# Patient Record
Sex: FEMALE | Race: OTHER | HISPANIC OR LATINO | Employment: FULL TIME | URBAN - METROPOLITAN AREA
[De-identification: names, ages, dates, MRNs, and addresses within clinical notes are randomized per-mention and may not be internally consistent; named-entity substitution may affect disease eponyms.]

---

## 2017-07-05 ENCOUNTER — OFFICE VISIT (OUTPATIENT)
Dept: URGENT CARE | Facility: MEDICAL CENTER | Age: 27
End: 2017-07-05
Payer: MEDICARE

## 2017-07-05 PROCEDURE — G0382 LEV 3 HOSP TYPE B ED VISIT: HCPCS

## 2017-07-05 PROCEDURE — 96372 THER/PROPH/DIAG INJ SC/IM: CPT

## 2017-07-05 PROCEDURE — 99283 EMERGENCY DEPT VISIT LOW MDM: CPT

## 2018-01-18 ENCOUNTER — OFFICE VISIT (OUTPATIENT)
Dept: URGENT CARE | Facility: MEDICAL CENTER | Age: 28
End: 2018-01-18
Payer: MEDICARE

## 2018-01-18 DIAGNOSIS — J11.1 INFLUENZA DUE TO UNIDENTIFIED INFLUENZA VIRUS WITH OTHER RESPIRATORY MANIFESTATIONS: ICD-10-CM

## 2018-01-18 PROCEDURE — 99213 OFFICE O/P EST LOW 20 MIN: CPT

## 2018-01-18 PROCEDURE — G0382 LEV 3 HOSP TYPE B ED VISIT: HCPCS

## 2018-01-19 ENCOUNTER — APPOINTMENT (OUTPATIENT)
Dept: LAB | Facility: HOSPITAL | Age: 28
End: 2018-01-19
Payer: MEDICARE

## 2018-01-19 DIAGNOSIS — J11.1 INFLUENZA DUE TO UNIDENTIFIED INFLUENZA VIRUS WITH OTHER RESPIRATORY MANIFESTATIONS: ICD-10-CM

## 2018-01-19 LAB
FLUAV AG SPEC QL: NORMAL
FLUBV AG SPEC QL: NORMAL
RSV B RNA SPEC QL NAA+PROBE: NORMAL

## 2018-01-19 PROCEDURE — 87798 DETECT AGENT NOS DNA AMP: CPT

## 2018-01-19 NOTE — PROGRESS NOTES
Assessment   1  Influenza-like syndrome (487 1) (J11 1)    Plan   Influenza-like syndrome    · Oseltamivir Phosphate 75 MG Oral Capsule (Tamiflu); TAKE 1 CAPSULE TWICE    DAILY    Discussion/Summary   Discussion Summary:    Flu test performed  Patient started on Tamiflu 75 milligram b i d  for 10 days  I recommended patient continue with ibuprofen for fever and body aches, increase fluid intake  Medication Side Effects Reviewed: Possible side effects of new medications were reviewed with the patient/guardian today  Understands and agrees with treatment plan: The treatment plan was reviewed with the patient/guardian  The patient/guardian understands and agrees with the treatment plan    Counseling Documentation With Imm: The patient was counseled regarding  Chief Complaint   Chief Complaint Free Text Note Form: Body aches and fatigue since yesterday; c/o most pain to post neck/occipital region  History of Present Illness   HPI: Patient with 1 day history of flu-like symptoms  Complaining of severe chills and body which developed today  Also complaining neck ache backache, headache  Taking some ibuprofen with mild improvement  Also complaining severe fatigue today  Denies any nausea or vomiting  She informs me she was recently exposed to a co-worker this past week who had the flu  Hospital Based Practices Required Assessment:      Pain Assessment      the patient states they have pain  (on a scale of 0 to 10, the patient rates the pain at 10 )      Abuse And Domestic Violence Screen       Yes, the patient is safe at home  -- The patient states no one is hurting them  Depression And Suicide Screen  No, the patient has not had thoughts of hurting themself  No, the patient has not felt depressed in the past 7 days  Review of Systems   Focused-Female:      Constitutional: fever,-- chills-- and-- feeling tired        ENT: no ear ache, no loss of hearing, no nosebleeds or nasal discharge, no sore throat or hoarseness  Respiratory: no complaints of shortness of breath, no wheezing, no dyspnea on exertion, no orthopnea or PND  Musculoskeletal: arthralgias-- and-- myalgias  Active Problems   1  Headache (784 0) (R51)    Past Medical History   Active Problems And Past Medical History Reviewed: The active problems and past medical history were reviewed and updated today  Family History   Mother    1  Family history of Type 2 diabetes mellitus without complication    Social History    · Never a smoker    Surgical History   1  History of Oral Surgery Tooth Extraction [facility]    Current Meds    1  Tri-Sprintec 0 18/0 215/0 25 MG-35 MCG Oral Tablet; Therapy: (Recorded:18Jan2018) to Recorded  Medication List Reviewed: The medication list was reviewed and updated today  Allergies   1  No Known Drug Allergies    Vitals   Signs   Recorded: [id number] 08:32PM   Temperature: 98 5 F  Heart Rate: 114  Respiration: 18  Systolic: 340  Diastolic: 76  BP Comments: manual  Height: 5 ft   Weight: 128 lb   BMI Calculated: 25  BSA Calculated: 1 54  O2 Saturation: 100  Pain Scale: 10    Physical Exam        Constitutional      General appearance: No acute distress, well appearing and well nourished  Ears, Nose, Mouth, and Throat      External inspection of ears and nose: Normal        Otoscopic examination: Tympanic membranes translucent with normal light reflex  Canals patent without erythema  Nasal mucosa, septum, and turbinates: Normal without edema or erythema  Oropharynx: Normal with no erythema, edema, exudate or lesions  Pulmonary      Auscultation of lungs: Clear to auscultation  Cardiovascular      Auscultation of heart: Normal rate and rhythm, normal S1 and S2, without murmurs  Lymphatic      Palpation of lymph nodes in neck: No lymphadenopathy  Message   Return to work or school:    Velvet Banks is under my professional care   She was seen in my office on 1/18/2018    She is able to return to work on  1/22/2018          Louise Blancas      Signatures    Electronically signed by : MAC Arredondo ; Jan 18 2018  9:24PM EST                       (Author)

## 2018-01-23 VITALS
DIASTOLIC BLOOD PRESSURE: 76 MMHG | HEART RATE: 114 BPM | HEIGHT: 60 IN | RESPIRATION RATE: 18 BRPM | BODY MASS INDEX: 25.13 KG/M2 | OXYGEN SATURATION: 100 % | TEMPERATURE: 98.5 F | SYSTOLIC BLOOD PRESSURE: 108 MMHG | WEIGHT: 128 LBS

## 2018-01-24 NOTE — MISCELLANEOUS
Message  Return to work or school:   Hallie Lopes is under my professional care   She was seen in my office on 1/18/2018   She is able to return to work on  1/22/2018       Radha Nichole      Signatures   Electronically signed by : MAC Gaviria ; Jan 18 2018  9:24PM EST                       (Author)

## 2018-08-20 ENCOUNTER — APPOINTMENT (EMERGENCY)
Dept: RADIOLOGY | Facility: HOSPITAL | Age: 28
End: 2018-08-20
Payer: MEDICARE

## 2018-08-20 ENCOUNTER — HOSPITAL ENCOUNTER (EMERGENCY)
Facility: HOSPITAL | Age: 28
Discharge: HOME | End: 2018-08-20
Attending: EMERGENCY MEDICINE
Payer: MEDICARE

## 2018-08-20 VITALS
SYSTOLIC BLOOD PRESSURE: 106 MMHG | WEIGHT: 130 LBS | TEMPERATURE: 99 F | HEART RATE: 98 BPM | DIASTOLIC BLOOD PRESSURE: 61 MMHG | OXYGEN SATURATION: 97 % | RESPIRATION RATE: 20 BRPM | BODY MASS INDEX: 25.39 KG/M2

## 2018-08-20 DIAGNOSIS — R10.9 FLANK PAIN: Primary | ICD-10-CM

## 2018-08-20 DIAGNOSIS — R30.0 DYSURIA: ICD-10-CM

## 2018-08-20 LAB
ANION GAP SERPL CALCULATED.3IONS-SCNC: 4 MMOL/L (ref 4–13)
BACTERIA UR QL AUTO: ABNORMAL /HPF
BASOPHILS # BLD AUTO: 0.01 THOUSANDS/ΜL (ref 0–0.1)
BASOPHILS NFR BLD AUTO: 0 % (ref 0–1)
BILIRUB UR QL STRIP: NEGATIVE
BUN SERPL-MCNC: 9 MG/DL (ref 5–25)
CALCIUM SERPL-MCNC: 8.4 MG/DL (ref 8.3–10.1)
CHLORIDE SERPL-SCNC: 103 MMOL/L (ref 100–108)
CLARITY UR: CLEAR
CO2 SERPL-SCNC: 25 MMOL/L (ref 21–32)
COLOR UR: YELLOW
CREAT SERPL-MCNC: 0.67 MG/DL (ref 0.6–1.3)
EOSINOPHIL # BLD AUTO: 0 THOUSAND/ΜL (ref 0–0.61)
EOSINOPHIL NFR BLD AUTO: 0 % (ref 0–6)
ERYTHROCYTE [DISTWIDTH] IN BLOOD BY AUTOMATED COUNT: 13.7 % (ref 11.6–15.1)
EXT PREG TEST URINE: NEGATIVE
GFR SERPL CREATININE-BSD FRML MDRD: 123 ML/MIN/1.73SQ M
GLUCOSE SERPL-MCNC: 95 MG/DL (ref 65–140)
GLUCOSE UR STRIP-MCNC: NEGATIVE MG/DL
HCT VFR BLD AUTO: 38 % (ref 34.8–46.1)
HGB BLD-MCNC: 12.3 G/DL (ref 11.5–15.4)
HGB UR QL STRIP.AUTO: ABNORMAL
IMM GRANULOCYTES # BLD AUTO: 0.02 THOUSAND/UL (ref 0–0.2)
IMM GRANULOCYTES NFR BLD AUTO: 0 % (ref 0–2)
KETONES UR STRIP-MCNC: ABNORMAL MG/DL
LEUKOCYTE ESTERASE UR QL STRIP: NEGATIVE
LYMPHOCYTES # BLD AUTO: 1.25 THOUSANDS/ΜL (ref 0.6–4.47)
LYMPHOCYTES NFR BLD AUTO: 17 % (ref 14–44)
MCH RBC QN AUTO: 27.8 PG (ref 26.8–34.3)
MCHC RBC AUTO-ENTMCNC: 32.4 G/DL (ref 31.4–37.4)
MCV RBC AUTO: 86 FL (ref 82–98)
MONOCYTES # BLD AUTO: 0.48 THOUSAND/ΜL (ref 0.17–1.22)
MONOCYTES NFR BLD AUTO: 7 % (ref 4–12)
MUCOUS THREADS UR QL AUTO: ABNORMAL
NEUTROPHILS # BLD AUTO: 5.62 THOUSANDS/ΜL (ref 1.85–7.62)
NEUTS SEG NFR BLD AUTO: 76 % (ref 43–75)
NITRITE UR QL STRIP: NEGATIVE
NON-SQ EPI CELLS URNS QL MICRO: ABNORMAL /HPF
NRBC BLD AUTO-RTO: 0 /100 WBCS
PH UR STRIP.AUTO: 5.5 [PH] (ref 4.5–8)
PLATELET # BLD AUTO: 179 THOUSANDS/UL (ref 149–390)
PMV BLD AUTO: 10.6 FL (ref 8.9–12.7)
POTASSIUM SERPL-SCNC: 3.6 MMOL/L (ref 3.5–5.3)
PROT UR STRIP-MCNC: ABNORMAL MG/DL
RBC # BLD AUTO: 4.43 MILLION/UL (ref 3.81–5.12)
RBC #/AREA URNS AUTO: ABNORMAL /HPF
SODIUM SERPL-SCNC: 132 MMOL/L (ref 136–145)
SP GR UR STRIP.AUTO: 1.02 (ref 1–1.03)
UROBILINOGEN UR QL STRIP.AUTO: 0.2 E.U./DL
WBC # BLD AUTO: 7.38 THOUSAND/UL (ref 4.31–10.16)
WBC #/AREA URNS AUTO: ABNORMAL /HPF

## 2018-08-20 PROCEDURE — 99284 EMERGENCY DEPT VISIT MOD MDM: CPT

## 2018-08-20 PROCEDURE — 85025 COMPLETE CBC W/AUTO DIFF WBC: CPT | Performed by: EMERGENCY MEDICINE

## 2018-08-20 PROCEDURE — 81025 URINE PREGNANCY TEST: CPT | Performed by: EMERGENCY MEDICINE

## 2018-08-20 PROCEDURE — 36415 COLL VENOUS BLD VENIPUNCTURE: CPT | Performed by: EMERGENCY MEDICINE

## 2018-08-20 PROCEDURE — 80048 BASIC METABOLIC PNL TOTAL CA: CPT | Performed by: EMERGENCY MEDICINE

## 2018-08-20 PROCEDURE — 81001 URINALYSIS AUTO W/SCOPE: CPT

## 2018-08-20 PROCEDURE — 74176 CT ABD & PELVIS W/O CONTRAST: CPT

## 2018-08-20 PROCEDURE — 96374 THER/PROPH/DIAG INJ IV PUSH: CPT

## 2018-08-20 RX ORDER — PHENAZOPYRIDINE HYDROCHLORIDE 200 MG/1
200 TABLET, FILM COATED ORAL 3 TIMES DAILY
Qty: 6 TABLET | Refills: 0 | Status: SHIPPED | OUTPATIENT
Start: 2018-08-20 | End: 2018-12-02

## 2018-08-20 RX ORDER — KETOROLAC TROMETHAMINE 30 MG/ML
15 INJECTION, SOLUTION INTRAMUSCULAR; INTRAVENOUS ONCE
Status: COMPLETED | OUTPATIENT
Start: 2018-08-20 | End: 2018-08-20

## 2018-08-20 RX ORDER — NORGESTIMATE AND ETHINYL ESTRADIOL 7DAYSX3 28
KIT ORAL
COMMUNITY
End: 2018-12-02

## 2018-08-20 RX ADMIN — KETOROLAC TROMETHAMINE 15 MG: 30 INJECTION, SOLUTION INTRAMUSCULAR at 19:55

## 2018-08-20 NOTE — ED PROVIDER NOTES
History  Chief Complaint   Patient presents with    Flank Pain     pt c/o bilateral flank pain since 0700 with a migraine  states migraine is better than earlier and has a hx of kidney stones  denies fevers     24year old female presents to the ED for evaluation of bilateral flank pain that started around 7am  She also complains that pain is migratory to her groin  Last for about 10 seconds  Not made better with advil 600mg that she took at 1:30pm  Pain is made better with deep inspiration  She also complains of back pain that has never stopped  Endorses dysuria and hematuria  Of note, patient states that chronically has hematuria and has been worked up with cystoscopy but there is no specific etiology to her hematuria  She also complains of a migraine that was gradual in onset at 7am, left sided, throbbing, and similar to her migraines of the past  Denies vaginal bleeding and vaginal discharge  Denies hx of STDs  She denies IVDA  Denies fever, neck pain, chest pain, shortness of breath, vomiting, abdominal pain, dysuria, constipation, diarrhea  Prior to Admission Medications   Prescriptions Last Dose Informant Patient Reported? Taking?   norgestimate-ethinyl estradiol (TRI-SPRINTEC) 0 18/0 215/0 25 MG-35 MCG per tablet 8/20/2018 at Unknown time  Yes Yes   Sig: Take by mouth      Facility-Administered Medications: None       Past Medical History:   Diagnosis Date    Kidney stones        History reviewed  No pertinent surgical history  History reviewed  No pertinent family history  I have reviewed and agree with the history as documented  Social History   Substance Use Topics    Smoking status: Never Smoker    Smokeless tobacco: Never Used    Alcohol use Yes      Comment: rarely        Review of Systems   Constitutional: Negative for appetite change, chills, diaphoresis, fatigue and fever     HENT: Negative for congestion, ear discharge, ear pain, hearing loss, postnasal drip, rhinorrhea, sneezing and sore throat  Eyes: Negative for pain, discharge and redness  Respiratory: Negative for choking, chest tightness, shortness of breath, wheezing and stridor  Cardiovascular: Negative for chest pain and palpitations  Gastrointestinal: Negative for abdominal distention, abdominal pain, blood in stool, constipation, diarrhea, nausea and vomiting  Genitourinary: Positive for dysuria, flank pain and hematuria  Negative for decreased urine volume, difficulty urinating and frequency  Musculoskeletal: Negative for arthralgias, gait problem, joint swelling and neck pain  Skin: Negative for color change, pallor and rash  Allergic/Immunologic: Negative for environmental allergies, food allergies and immunocompromised state  Neurological: Positive for headaches  Negative for dizziness, seizures, weakness, light-headedness and numbness  Hematological: Negative for adenopathy  Does not bruise/bleed easily  Psychiatric/Behavioral: Negative for agitation and behavioral problems  Physical Exam  ED Triage Vitals   Temperature Pulse Respirations Blood Pressure SpO2   08/20/18 1846 08/20/18 1846 08/20/18 1846 08/20/18 1846 08/20/18 1846   99 °F (37 2 °C) (!) 119 20 116/75 100 %      Temp Source Heart Rate Source Patient Position - Orthostatic VS BP Location FiO2 (%)   08/20/18 1846 08/20/18 1955 08/20/18 2030 08/20/18 2030 --   Oral Monitor Sitting Right arm       Pain Score       08/20/18 1846       8           Orthostatic Vital Signs  Vitals:    08/20/18 1846 08/20/18 1955 08/20/18 2000 08/20/18 2030   BP: 116/75 114/73 116/72 106/61   Pulse: (!) 119 102 100 98   Patient Position - Orthostatic VS:    Sitting       Physical Exam   Constitutional: She is oriented to person, place, and time  She appears well-developed and well-nourished  HENT:   Head: Normocephalic and atraumatic     Nose: Nose normal    Mouth/Throat: Oropharynx is clear and moist    Eyes: Conjunctivae and EOM are normal  Pupils are equal, round, and reactive to light  Neck: Normal range of motion  Neck supple  Cardiovascular: Normal rate, regular rhythm and normal heart sounds  Exam reveals no gallop and no friction rub  No murmur heard  Pulmonary/Chest: Effort normal and breath sounds normal    Abdominal: Soft  Bowel sounds are normal  She exhibits no distension  There is no tenderness  There is no rebound and no guarding  Bilateral CVA tenderness   Musculoskeletal: Normal range of motion  Neurological: She is alert and oriented to person, place, and time  She has normal strength and normal reflexes  No cranial nerve deficit or sensory deficit  She displays a negative Romberg sign  Skin: Skin is warm and dry  No erythema  No pallor  Psychiatric: She has a normal mood and affect  Her behavior is normal    Nursing note and vitals reviewed  ED Medications  Medications   ketorolac (TORADOL) injection 15 mg (15 mg Intravenous Given 8/20/18 1955)       Diagnostic Studies  Results Reviewed     Procedure Component Value Units Date/Time    Basic metabolic panel [[id number]]  (Abnormal) Collected:  08/20/18 1954    Lab Status:  Final result Specimen:  Blood from Arm, Left Updated:  08/20/18 2043     Sodium 132 (L) mmol/L      Potassium 3 6 mmol/L      Chloride 103 mmol/L      CO2 25 mmol/L      Anion Gap 4 mmol/L      BUN 9 mg/dL      Creatinine 0 67 mg/dL      Glucose 95 mg/dL      Calcium 8 4 mg/dL      eGFR 123 ml/min/1 73sq m     Narrative:         National Kidney Disease Education Program recommendations are as follows:  GFR calculation is accurate only with a steady state creatinine  Chronic Kidney disease less than 60 ml/min/1 73 sq  meters  Kidney failure less than 15 ml/min/1 73 sq  meters      CBC and differential [[id number]]  (Abnormal) Collected:  08/20/18 1954    Lab Status:  Final result Specimen:  Blood from Arm, Left Updated:  08/20/18 2021     WBC 7 38 Thousand/uL      RBC 4 43 Million/uL      Hemoglobin 12 3 g/dL Hematocrit 38 0 %      MCV 86 fL      MCH 27 8 pg      MCHC 32 4 g/dL      RDW 13 7 %      MPV 10 6 fL      Platelets 623 Thousands/uL      nRBC 0 /100 WBCs      Neutrophils Relative 76 (H) %      Immat GRANS % 0 %      Lymphocytes Relative 17 %      Monocytes Relative 7 %      Eosinophils Relative 0 %      Basophils Relative 0 %      Neutrophils Absolute 5 62 Thousands/µL      Immature Grans Absolute 0 02 Thousand/uL      Lymphocytes Absolute 1 25 Thousands/µL      Monocytes Absolute 0 48 Thousand/µL      Eosinophils Absolute 0 00 Thousand/µL      Basophils Absolute 0 01 Thousands/µL     Urine Microscopic [[id number]]  (Abnormal) Collected:  08/20/18 1940    Lab Status:  Final result Specimen:  Urine from Urine, Clean Catch Updated:  08/20/18 2020     RBC, UA 2-4 (A) /hpf      WBC, UA 2-4 (A) /hpf      Epithelial Cells Occasional /hpf      Bacteria, UA Occasional /hpf      MUCOUS THREADS Occasional (A)    POCT urinalysis dipstick [[id number]]  (Normal) Resulted:  08/20/18 1928    Lab Status:  Final result Specimen:  Urine Updated:  08/20/18 1928     Clarity, UA --    POCT pregnancy, urine [[id number]]  (Normal) Resulted:  08/20/18 1927    Lab Status:  Final result Updated:  08/20/18 1928     EXT PREG TEST UR (Ref: Negative) negative    ED Urine Macroscopic [[id number]]  (Abnormal) Collected:  08/20/18 1940    Lab Status:  Final result Specimen:  Urine Updated:  08/20/18 1927     Color, UA Yellow     Clarity, UA Clear     pH, UA 5 5     Leukocytes, UA Negative     Nitrite, UA Negative     Protein, UA 30 (1+) (A) mg/dl      Glucose, UA Negative mg/dl      Ketones, UA 40 (2+) (A) mg/dl      Urobilinogen, UA 0 2 E U /dl      Bilirubin, UA Negative     Blood, UA Large (A)     Specific [place], UA 1 025    Narrative:       CLINITEK RESULT                 CT renal stone study abdomen pelvis wo contrast   Final Result by Kala Villeda MD (08/20 2101)         1  No evidence of nephrolithiasis or obstructive uropathy  2   No evidence of bowel obstruction, colitis or diverticulitis  Normal appendix  3   Small amount of fluid in the pelvis may be physiologic  No adnexal mass or pathologic cyst              Workstation performed: [id number]               Procedures  Procedures      Phone Consults  ED Phone Contact    ED Course  ED Course as of Aug 24 0853   Mon Aug 20, 2018   1940 Blood, UA: (!) Large   1940 EXT PREG TEST UR (Ref: Negative): negative                               MDM  Number of Diagnoses or Management Options  Dysuria:   Flank pain:   Diagnosis management comments: 24year old female presents to the ED for evaluation of bilateral flank pain     MDM: I will order cbc, bmp, ua, pregnancy test, toradol for pain control  Ct renal stone study  Reassess patient  CritCare Time    Disposition  Final diagnoses:   Flank pain   Dysuria     Time reflects when diagnosis was documented in both MDM as applicable and the Disposition within this note     Time User Action Codes Description Comment    8/20/2018  9:09 PM Onur Risk Add [R10 9] Flank pain     8/20/2018  9:10 PM Onur Risk Add [R30 0] Dysuria       ED Disposition     ED Disposition Condition Comment    Discharge  [address] discharge to home/self care  Condition at discharge: Stable        Follow-up Information     Follow up With Specialties Details Why [address] [place] [address],[address] [id number] [phone]            Discharge Medication List as of 8/20/2018  9:11 PM      CONTINUE these medications which have NOT CHANGED    Details   norgestimate-ethinyl estradiol (TRI-SPRINTEC) 0 18/0 215/0 25 MG-35 MCG per tablet Take by mouth, Historical Med           No discharge procedures on file  ED Provider  Attending physically available and evaluated [address]  I managed the patient along with the ED Attending      Electronically Signed by         Bell Valentino MD  08/24/18 7524

## 2018-12-01 ENCOUNTER — HOSPITAL ENCOUNTER (EMERGENCY)
Facility: HOSPITAL | Age: 28
Discharge: HOME | End: 2018-12-02
Attending: EMERGENCY MEDICINE
Payer: MEDICARE

## 2018-12-01 VITALS
OXYGEN SATURATION: 99 % | TEMPERATURE: 96.7 F | DIASTOLIC BLOOD PRESSURE: 78 MMHG | RESPIRATION RATE: 18 BRPM | WEIGHT: 140 LBS | BODY MASS INDEX: 27.48 KG/M2 | SYSTOLIC BLOOD PRESSURE: 119 MMHG | HEIGHT: 60 IN | HEART RATE: 103 BPM

## 2018-12-01 DIAGNOSIS — O46.90 VAGINAL BLEEDING IN PREGNANCY: Primary | ICD-10-CM

## 2018-12-01 PROCEDURE — 99284 EMERGENCY DEPT VISIT MOD MDM: CPT

## 2018-12-02 LAB
ANION GAP BLD CALC-SCNC: 18 MMOL/L (ref 4–13)
BACTERIA UR QL AUTO: ABNORMAL /HPF
BILIRUB UR QL STRIP: NEGATIVE
BUN BLD-MCNC: 11 MG/DL (ref 5–25)
CA-I BLD-SCNC: 1.18 MMOL/L (ref 1.12–1.32)
CHLORIDE BLD-SCNC: 100 MMOL/L (ref 100–108)
CLARITY UR: CLEAR
COLOR UR: YELLOW
COLOR, POC: YELLOW
CREAT BLD-MCNC: 0.6 MG/DL (ref 0.6–1.3)
GFR SERPL CREATININE-BSD FRML MDRD: 126 ML/MIN/1.73SQ M
GLUCOSE SERPL-MCNC: 92 MG/DL (ref 65–140)
GLUCOSE UR STRIP-MCNC: NEGATIVE MG/DL
HCT VFR BLD CALC: 32 % (ref 34.8–46.1)
HGB BLDA-MCNC: 10.9 G/DL (ref 11.5–15.4)
HGB UR QL STRIP.AUTO: ABNORMAL
HYALINE CASTS #/AREA URNS LPF: ABNORMAL /LPF
KETONES UR STRIP-MCNC: NEGATIVE MG/DL
LEUKOCYTE ESTERASE UR QL STRIP: NEGATIVE
NITRITE UR QL STRIP: NEGATIVE
NON-SQ EPI CELLS URNS QL MICRO: ABNORMAL /HPF
PCO2 BLD: 25 MMOL/L (ref 21–32)
PH UR STRIP.AUTO: 6 [PH] (ref 4.5–8)
POTASSIUM BLD-SCNC: 3.9 MMOL/L (ref 3.5–5.3)
PROT UR STRIP-MCNC: NEGATIVE MG/DL
RBC #/AREA URNS AUTO: ABNORMAL /HPF
SODIUM BLD-SCNC: 138 MMOL/L (ref 136–145)
SP GR UR STRIP.AUTO: 1.02 (ref 1–1.03)
SPECIMEN SOURCE: ABNORMAL
UROBILINOGEN UR QL STRIP.AUTO: 0.2 E.U./DL
WBC #/AREA URNS AUTO: ABNORMAL /HPF

## 2018-12-02 PROCEDURE — 81001 URINALYSIS AUTO W/SCOPE: CPT

## 2018-12-02 PROCEDURE — 80047 BASIC METABLC PNL IONIZED CA: CPT

## 2018-12-02 PROCEDURE — 85014 HEMATOCRIT: CPT

## 2018-12-02 NOTE — ED ATTENDING ATTESTATION
Vinicio Davison MD, saw and evaluated the patient  All available labs and X-rays were ordered by me or the resident and have been reviewed by myself  I discussed the patient with the resident / non-physician and agree with the resident's / non-physician practitioner's findings and plan as documented in the resident's / non-physician practicitioner's note, except where noted  At this point, I agree with the current assessment done in the ED  Chief Complaint   Patient presents with    Vaginal Bleeding - Pregnant     Pt reports that she is about 2 months pregnant and around 1700 tonight she reports she had a gush of vaginal blood and is on her third pad  She reports mild abdominal and back pain  This is a G31 62-year-old female at maybe 2-3 months gestation presenting for evaluation of vaginal bleeding  She has been feeling pretty well last few days until today when she was getting ready for her work office party for [employer]  While she was getting ready, she all of a sudden felt a gush of blood and noted bright red blood noted on pad  She had no fevers chills, she has nausea and vomiting every day but nothing different than normal   No blood in the emesis  She denies dizziness or lightheadedness  Denies chest pain or shortness of breath  She does mention that her urine is much more cloudy than it normally is as well as a foul smell  Denies any frequency  Denies burning  No new sexual activity, using toys  No diarrhea constipation  No blood thinners  She did have bleeding during her 1st pregnancy as well as maybe some preeclampsia  PMH:  - kidney stones  - pre-eclampsia  PSH:  - none  No smoking drinking drugs   PE:  Vitals:    12/01/18 2245   BP: 119/78   BP Location: Right arm   Pulse: 103   Resp: 18   Temp: (!) 96 7 °F (35 9 °C)   TempSrc: Tympanic   SpO2: 99%   Weight: 63 5 kg (140 lb)   Height: 5' (1 524 m)   General: VSS, NAD, awake, alert  Well-nourished, well-developed   Appears stated age  Speaking normally in full sentences  Head: Normocephalic, atraumatic, nontender  Eyes: PERRL, EOM-I  No diplopia  No hyphema  No subconjunctival hemorrhages  Symmetrical lids  ENT: Atraumatic external nose and ears  MMM  No malocclusion  No stridor  Normal phonation  No drooling  Normal swallowing  Neck: Symmetric, trachea midline  No JVD  CV: RRR  +S1/S2  No murmurs or gallops  Peripheral pulses +2 throughout  No chest wall tenderness  Lungs:   Unlabored No retractions  CTAB, lungs sounds equal bilateral    No tachypnea  Abd: +BS, soft, minimal suprapubpic tenderness  ND   Heel strike negative  ND  MSK:   FROM   Back:   No rashes  No cvat  Skin: Dry, intact  Neuro: AAOx3, GCS 15, CN II-XII grossly intact  Motor grossly intact  Psychiatric/Behavioral: Appropriate mood and affect   Exam: deferred  A:  - First trimester vaginal bleeding  P:  - Reviewed with patient that first trimester bleeding occurs in 20% of women  Reviewed that of these 20%, 50% will miscarry, 50% will not miscarry  - Lastly, reviewed with patient that if she has an IUP with a FHR, this drops the 50% miscarriage rate to only 1%  - She had an IUP with questionable subchorionic hemorrhage noted  - Review of EPIC --> The patient has a documented Rh documented for possible RhoGam @ [facility] was O+  - 13 point ROS was performed and all are normal unless stated in the history above  - Nursing note reviewed  Vitals reviewed  - Orders placed by myself and/or advanced practitioner / resident     - Previous chart was reviewed  - No language barrier    - History obtained from patient  - There are no limitations to the history obtained  - Critical care time: Not applicable for this patient  Final Diagnosis:  1   Vaginal bleeding in pregnancy         Medications - No data to display  No orders to display     Orders Placed This Encounter   Procedures    Urine Microscopic    POCT chem 8+    POCT urinalysis dipstick     Labs Reviewed   ED URINE MACROSCOPIC - Abnormal        Result Value Ref Range Status    Color, UA Yellow   Final    Clarity, UA Clear   Final    pH, UA 6 0  4 5 - 8 0 Final    Leukocytes, UA Negative  Negative Final    Nitrite, UA Negative  Negative Final    Protein, UA Negative  Negative mg/dl Final    Glucose, UA Negative  Negative mg/dl Final    Ketones, UA Negative  Negative mg/dl Final    Urobilinogen, UA 0 2  0 2, 1 0 E U /dl E U /dl Final    Bilirubin, UA Negative  Negative Final    Blood, UA Large (*) Negative Final    Specific Gravity, UA 1 025  1 003 - 1 030 Final    Narrative:     CLINITEK RESULT   POCT CHEM 8+ - Abnormal     SODIUM, I-STAT 138  136 - 145 mmol/l Final    Potassium, i-STAT 3 9  3 5 - 5 3 mmol/L Final    Chloride, istat 100  100 - 108 mmol/L Final    CO2, i-STAT 25  21 - 32 mmol/L Final    Anion Gap, i-STAT 18 (*) 4 - 13 mmol/L Final    Calcium, Ionized i-STAT 1 18  1 12 - 1 32 mmol/L Final    BUN, I-STAT 11  5 - 25 mg/dl Final    Creatinine, i-STAT 0 6  0 6 - 1 3 mg/dl Final    eGFR 126  ml/min/1 73sq m Final    Glucose, i-STAT 92  65 - 140 mg/dl Final    Hct, i-STAT 32 (*) 34 8 - 46 1 % Final    Hgb, i-STAT 10 9 (*) 11 5 - 15 4 g/dl Final    Specimen Type VENOUS   Final   POCT URINALYSIS DIPSTICK - Normal    Color, UA yellow   Final     Time reflects when diagnosis was documented in both MDM as applicable and the Disposition within this note     Time User Action Codes Description Comment    12/2/2018 12:43 AM Kiley Graham Erm Add [O46 90] Vaginal bleeding in pregnancy       ED Disposition     ED Disposition Condition Comment    Discharge  Rhiannon Hollis discharge to home/self care      Condition at discharge: Stable        Follow-up Information     Follow up With Specialties Details Why Contact Info Additional [facility]  within 2-3 days for reevaluation and further workup as directed [address] [phone]  [address] [place] [address], [phone]        Discharge Medication List as of 12/2/2018 12:49 AM      CONTINUE these medications which have NOT CHANGED    Details   Prenatal MV-Min-Fe Fum-FA-DHA (PRENATAL 1 PO) Take 1 tablet by mouth daily, Historical Med           No discharge procedures on file  Prior to Admission Medications   Prescriptions Last Dose Informant Patient Reported? Taking? Prenatal MV-Min-Fe Fum-FA-DHA (PRENATAL 1 PO)   Yes Yes   Sig: Take 1 tablet by mouth daily      Facility-Administered Medications: None       Portions of the record may have been created with voice recognition software  Occasional wrong word or "sound a like" substitutions may have occurred due to the inherent limitations of voice recognition software  Read the chart carefully and recognize, using context, where substitutions have occurred      Electronically signed by:  Lauren Jimenes

## 2018-12-02 NOTE — DISCHARGE INSTRUCTIONS
Stay well hydrated  Pelvic rest  Continue prenatal vitamins, Tylenol as directed for pain  Follow up with OBGYN for reevaluation and further workup of presenting symptoms  Return to ED if new or worsening symptoms for immediate reevaluation  Threatened Miscarriage   WHAT YOU NEED TO KNOW:   A threatened miscarriage occurs when you have vaginal bleeding within the first 20 weeks of pregnancy  It means that a miscarriage may happen  A threatened miscarriage may also be called a threatened   DISCHARGE INSTRUCTIONS:   Return to the emergency department if:   · You feel weak or faint  · Your pain or cramping in your abdomen or back gets worse  · You have vaginal bleeding that soaks 1 or more pads in an hour  · You pass material that looks like tissue or large clots  Contact your healthcare provider or obstetrician if:   · You have a fever  · You have trouble urinating, burning when you urinate, or feel a need to urinate often  · You have new or worsening vaginal bleeding  · You have vaginal pain or itching, or vaginal discharge that is yellow, green, or foul-smelling  · You have questions or concerns about your condition or care  Self-care: The following may help you manage your symptoms and decrease your risk for a miscarriage:  · Do not put anything in your vagina  Do not have sex, douche, or use tampons  These actions may increase your risk for infection and miscarriage  · Rest as directed  Do not exercise or do strenuous activities  These activities may cause  labor or miscarriage  Ask your healthcare provider what activities are okay to do  Stay healthy during pregnancy:   · Eat a variety of healthy foods  Healthy foods can help you get extra protein, water, and calories that you need while you are pregnant  Healthy foods include fruits, vegetables, whole-grain breads, low-fat dairy products, beans, lean meats, and fish   Avoid raw or undercooked meat and fish  Ask your healthcare provider if you need a special diet  · Take prenatal vitamins as directed  These help you get the right amount of vitamins and minerals  They may also decrease the risk of certain birth defects  · Do not drink alcohol or use illegal drugs  These can increase your risk for a miscarriage or harm your baby  · Do not smoke  Nicotine and other chemicals in cigarettes and cigars can harm your baby and cause miscarriage or  labor  Ask your healthcare provider for information if you currently smoke and need help to quit  E-cigarettes or smokeless tobacco still contain nicotine  Do not use these products  · Decrease your risk for an infection  Always wash your hands before eating or preparing meals  Do not spend time with people who are sick  Ask your healthcare provider if you need immunizations such as the flu or hepatitis B vaccine  Immunizations may decrease your risk for infections that could cause a miscarriage  · Manage your medical conditions  Keep your blood pressure and blood sugars under control  Maintain a healthy weight during pregnancy  Follow up with your obstetrician as directed: You may need to see your obstetrician frequently for ultrasounds or blood tests  Write down your questions so you remember to ask them during your visits  ©  [employer] [employer] Information is for End User's use only and may not be sold, redistributed or otherwise used for commercial purposes  All illustrations and images included in CareNotes® are the copyrighted property of [employer]  or [employer]  The above information is an  only  It is not intended as medical advice for individual conditions or treatments  Talk to your doctor, nurse or pharmacist before following any medical regimen to see if it is safe and effective for you

## 2018-12-02 NOTE — ED PROVIDER NOTES
History  Chief Complaint   Patient presents with    Vaginal Bleeding - Pregnant     Pt reports that she is about 2 months pregnant and around 1700 tonight she reports she had a gush of vaginal blood and is on her third pad  She reports mild abdominal and back pain  28-year-old female  who states that last menstrual cycle was second or third week of September presents emergency department with  A couple episodes of vaginal bleeding described as a gush of blood 2 times earlier tonight  Patient states that she has had some mild abdominal cramping with the bleeding which has since resolved, complains of mild generalized low back pain, also states cloudy appearance and foul smell to urine  Pt denies any trauma/injury, HA, visual changes, dizziness/lightheadedness/syncope, neck/flank pain, chest pain, dyspnea cough/cold symptoms, fever/chills, (+) N/V during this pregnancy but states no change recently from baseline, no diarrhea/constipation, dysuria/frequency/urgency, rash, peripheral edema or focal neuro deficits  Pt states last pregnancy complicated by vaginal bleeding and possible preeclampsia  Pt states she has not seen OBGYN or had confirmatory ultrasound secondary to insurance issues  No significant PSH  Pt is a nonsmoker, denies any ETOH or recreational drug use  Only daily medication is prenatal vitamin, no interventions prior to arrival, no other complaints at this time  [address] [place] [address]")3/30/2016 O POSITIVE Antibody NEGATIVE                    Prior to Admission Medications   Prescriptions Last Dose Informant Patient Reported? Taking? Prenatal MV-Min-Fe Fum-FA-DHA (PRENATAL 1 PO)   Yes Yes   Sig: Take 1 tablet by mouth daily      Facility-Administered Medications: None       Past Medical History:   Diagnosis Date    Kidney stones        History reviewed  No pertinent surgical history      History reviewed  No pertinent family history  I have reviewed and agree with the history as documented  Social History   Substance Use Topics    Smoking status: Never Smoker    Smokeless tobacco: Never Used    Alcohol use Yes      Comment: rarely        Review of Systems   Constitutional: Negative for chills, fatigue and fever  HENT: Negative for congestion, rhinorrhea and sore throat  Eyes: Negative for pain, redness and visual disturbance  Respiratory: Negative for cough, chest tightness, shortness of breath, wheezing and stridor  Cardiovascular: Negative for chest pain, palpitations and leg swelling  Gastrointestinal: Positive for abdominal pain (intermittent abdominal cramping (not at time of exam))  Negative for blood in stool, constipation, diarrhea, nausea and vomiting  Genitourinary: Positive for vaginal bleeding and vaginal pain  Negative for dysuria, flank pain, frequency, hematuria, urgency and vaginal discharge  Cloudy/foul-smelling urine   Musculoskeletal: Positive for back pain (generalized low back pain)  Negative for neck pain  Skin: Negative for pallor and rash  Neurological: Negative for dizziness, seizures, syncope, weakness, light-headedness and headaches  Psychiatric/Behavioral: Negative for agitation and confusion  Physical Exam  ED Triage Vitals [12/01/18 2245]   Temperature Pulse Respirations Blood Pressure SpO2   (!) 96 7 °F (35 9 °C) 103 18 119/78 99 %      Temp Source Heart Rate Source Patient Position - Orthostatic VS BP Location FiO2 (%)   Tympanic Monitor Lying Right arm --      Pain Score       9           Orthostatic Vital Signs  Vitals:    12/01/18 2245   BP: 119/78   Pulse: 103   Patient Position - Orthostatic VS: Lying       Physical Exam   Constitutional: She is oriented to person, place, and time  She appears well-developed and well-nourished  No distress  HENT:   Head: Normocephalic and atraumatic     Nose: Nose normal    Mouth/Throat: Oropharynx is clear and moist  No oropharyngeal exudate  Eyes: Pupils are equal, round, and reactive to light  Conjunctivae and EOM are normal  Right eye exhibits no discharge  Left eye exhibits no discharge  Neck: Normal range of motion  Neck supple  No JVD present  No tracheal deviation present  Cardiovascular: Normal rate, regular rhythm, normal heart sounds and intact distal pulses  Exam reveals no gallop and no friction rub  No murmur heard  Pulmonary/Chest: Effort normal and breath sounds normal  No stridor  No respiratory distress  She has no wheezes  She has no rales  She exhibits no tenderness  Abdominal: Soft  Bowel sounds are normal  She exhibits no distension  There is no tenderness  There is no rebound and no guarding  Musculoskeletal: Normal range of motion  She exhibits no edema, tenderness or deformity  Neurological: She is alert and oriented to person, place, and time  No cranial nerve deficit  Skin: Skin is warm and dry  Capillary refill takes less than 2 seconds  No rash noted  She is not diaphoretic  Psychiatric: She has a normal mood and affect  Nursing note and vitals reviewed                            ED Medications  Medications - No data to display    Diagnostic Studies  Results Reviewed     Procedure Component Value Units Date/Time    Urine Microscopic [[id number]]  (Abnormal) Collected:  12/02/18 0010    Lab Status:  Final result Specimen:  Urine from Urine, Clean Catch Updated:  12/02/18 0116     RBC, UA Innumerable (A) /hpf      WBC, UA None Seen /hpf      Epithelial Cells None Seen /hpf      Bacteria, UA None Seen /hpf      Hyaline Casts, UA None Seen /lpf     POCT Chem 8+ [[id number]]  (Abnormal) Collected:  12/02/18 0011    Lab Status:  Final result Updated:  12/02/18 0015     SODIUM, I-STAT 138 mmol/l      Potassium, i-STAT 3 9 mmol/L      Chloride, istat 100 mmol/L      CO2, i-STAT 25 mmol/L      Anion Gap, i-STAT 18 (H) mmol/L      Calcium, Ionized i-STAT 1 18 mmol/L      BUN, I-STAT 11 mg/dl      Creatinine, i-STAT 0 6 mg/dl      eGFR 126 ml/min/1 73sq m      Glucose, i-STAT 92 mg/dl      Hct, i-STAT 32 (L) %      Hgb, i-STAT 10 9 (L) g/dl      Specimen Type VENOUS    POCT urinalysis dipstick [[id number]]  (Normal) Resulted:  12/02/18 0012    Lab Status:  Final result Specimen:  Urine Updated:  12/02/18 0012     Color, UA yellow    ED Urine Macroscopic [[id number]]  (Abnormal) Collected:  12/02/18 0010    Lab Status:  Final result Specimen:  Urine Updated:  12/02/18 0009     Color, UA Yellow     Clarity, UA Clear     pH, UA 6 0     Leukocytes, UA Negative     Nitrite, UA Negative     Protein, UA Negative mg/dl      Glucose, UA Negative mg/dl      Ketones, UA Negative mg/dl      Urobilinogen, UA 0 2 E U /dl      Bilirubin, UA Negative     Blood, UA Large (A)     Specific [place], UA 1 025    Narrative:       [facility] RESULT                 No orders to display         Procedures  Procedures      Phone Consults  ED Phone Contact    ED Course             Patient updated on results of tests and plan of care  Discharge instructions given including medications, follow-up and return precautions with understanding verbalized  MDM  CritCare Time    Disposition  Final diagnoses:   Vaginal bleeding in pregnancy     Time reflects when diagnosis was documented in both MDM as applicable and the Disposition within this note     Time User Action Codes Description Comment    12/2/2018 12:43 AM Evelyn Graham Add [O46 90] Vaginal bleeding in pregnancy       ED Disposition     ED Disposition Condition Comment    Discharge  [address] discharge to home/self care      Condition at discharge: Stable        Follow-up Information     Follow up With Specialties Details Why Contact Info [facility]  within 2-3 days for reevaluation and further workup as directed [address] [phone]  [address] [place] [address], [phone]          Discharge Medication List as of 12/2/2018 12:49 AM      CONTINUE these medications which have NOT CHANGED    Details   Prenatal MV-Min-Fe Fum-FA-DHA (PRENATAL 1 PO) Take 1 tablet by mouth daily, Historical Med           No discharge procedures on file  ED Provider  Attending physically available and evaluated Tom Zepeda I managed the patient along with the ED Attending      Electronically Signed by         Benjamin Xiong,   12/02/18 See Hoyos, DO  12/02/18 9003

## 2019-10-24 ENCOUNTER — DOCTOR'S OFFICE (OUTPATIENT)
Dept: URBAN - METROPOLITAN AREA CLINIC 136 | Facility: CLINIC | Age: 29
Setting detail: OPHTHALMOLOGY
End: 2019-10-24
Payer: COMMERCIAL

## 2019-10-24 ENCOUNTER — RX ONLY (RX ONLY)
Age: 29
End: 2019-10-24

## 2019-10-24 DIAGNOSIS — H53.2: ICD-10-CM

## 2019-10-24 DIAGNOSIS — H05.89: ICD-10-CM

## 2019-10-24 PROCEDURE — 99203 OFFICE O/P NEW LOW 30 MIN: CPT | Performed by: OPHTHALMOLOGY

## 2019-10-24 ASSESSMENT — REFRACTION_CURRENTRX
OS_AXIS: 030
OD_VPRISM_DIRECTION: SV
OS_SPHERE: -2.00
OD_OVR_VA: 20/
OS_OVR_VA: 20/
OD_OVR_VA: 20/
OS_CYLINDER: -0.50
OD_CYLINDER: -0.25
OD_SPHERE: -2.75
OD_OVR_VA: 20/
OS_OVR_VA: 20/
OD_AXIS: 175
OS_VPRISM_DIRECTION: SV
OS_OVR_VA: 20/

## 2019-10-24 ASSESSMENT — REFRACTION_MANIFEST
OS_VA1: 20/
OD_VA1: 20/
OD_VA2: 20/
OU_VA: 20/
OD_VA3: 20/
OS_VA2: 20/
OD_VA3: 20/
OS_VA3: 20/
OS_VA1: 20/
OS_VA2: 20/
OU_VA: 20/
OD_VA2: 20/
OS_VA3: 20/
OD_VA1: 20/

## 2019-10-24 ASSESSMENT — CONFRONTATIONAL VISUAL FIELD TEST (CVF)
OD_FINDINGS: FULL
OS_FINDINGS: FULL

## 2019-10-24 ASSESSMENT — VISUAL ACUITY
OS_BCVA: 20/20
OD_BCVA: 20/20

## 2019-10-25 ENCOUNTER — RX ONLY (RX ONLY)
Age: 29
End: 2019-10-25

## 2019-10-25 ENCOUNTER — DOCTOR'S OFFICE (OUTPATIENT)
Dept: URBAN - METROPOLITAN AREA CLINIC 136 | Facility: CLINIC | Age: 29
Setting detail: OPHTHALMOLOGY
End: 2019-10-25
Payer: COMMERCIAL

## 2019-10-25 DIAGNOSIS — H05.10: ICD-10-CM

## 2019-10-25 DIAGNOSIS — G43.009: ICD-10-CM

## 2019-10-25 DIAGNOSIS — H50.112: ICD-10-CM

## 2019-10-25 PROCEDURE — 99213 OFFICE O/P EST LOW 20 MIN: CPT | Performed by: OPHTHALMOLOGY

## 2019-10-28 ASSESSMENT — REFRACTION_CURRENTRX
OD_VPRISM_DIRECTION: SV
OS_AXIS: 115
OS_VPRISM_DIRECTION: SV
OS_OVR_VA: 20/
OD_AXIS: 085
OD_OVR_VA: 20/
OS_OVR_VA: 20/
OD_OVR_VA: 20/
OD_OVR_VA: 20/
OS_OVR_VA: 20/
OD_CYLINDER: +0.25
OS_CYLINDER: +0.50
OS_SPHERE: --3.00
OD_SPHERE: -3.00

## 2019-10-28 ASSESSMENT — REFRACTION_MANIFEST
OS_VA2: 20/
OS_VA3: 20/
OD_VA2: 20/
OD_VA3: 20/
OD_VA1: 20/
OD_VA1: 20/
OU_VA: 20/
OU_VA: 20/
OS_VA1: 20/
OD_VA3: 20/
OS_VA2: 20/
OS_VA1: 20/
OD_VA2: 20/
OS_VA3: 20/

## 2019-10-28 ASSESSMENT — REFRACTION_AUTOREFRACTION
OD_CYLINDER: +0.50
OD_AXIS: 004
OS_CYLINDER: +0.25
OD_SPHERE: -3.00
OS_AXIS: 125
OS_SPHERE: -3.00

## 2019-10-28 ASSESSMENT — VISUAL ACUITY
OS_BCVA: 20/20
OD_BCVA: 20/20

## 2019-10-28 ASSESSMENT — SPHEQUIV_DERIVED
OS_SPHEQUIV: -2.875
OD_SPHEQUIV: -2.75

## 2019-10-30 ENCOUNTER — DOCTOR'S OFFICE (OUTPATIENT)
Dept: URBAN - METROPOLITAN AREA CLINIC 136 | Facility: CLINIC | Age: 29
Setting detail: OPHTHALMOLOGY
End: 2019-10-30
Payer: COMMERCIAL

## 2019-10-30 DIAGNOSIS — G43.009: ICD-10-CM

## 2019-10-30 DIAGNOSIS — H05.10: ICD-10-CM

## 2019-10-30 DIAGNOSIS — H50.112: ICD-10-CM

## 2019-10-30 PROCEDURE — 92014 COMPRE OPH EXAM EST PT 1/>: CPT | Performed by: OPHTHALMOLOGY

## 2019-10-30 ASSESSMENT — REFRACTION_CURRENTRX
OS_OVR_VA: 20/
OD_VPRISM_DIRECTION: SV
OD_SPHERE: -3.00
OS_SPHERE: --3.00
OS_AXIS: 115
OS_CYLINDER: +0.50
OD_OVR_VA: 20/
OS_OVR_VA: 20/
OD_CYLINDER: +0.25
OD_AXIS: 085
OS_OVR_VA: 20/
OS_VPRISM_DIRECTION: SV

## 2019-10-30 ASSESSMENT — REFRACTION_AUTOREFRACTION
OS_CYLINDER: +0.25
OS_AXIS: 125
OD_SPHERE: -3.00
OD_CYLINDER: +0.50
OD_AXIS: 004
OS_SPHERE: -3.00

## 2019-10-30 ASSESSMENT — REFRACTION_MANIFEST
OD_VA2: 20/
OD_VA1: 20/
OU_VA: 20/
OS_VA2: 20/
OD_VA3: 20/
OS_VA2: 20/
OS_VA3: 20/
OD_VA1: 20/
OD_VA3: 20/
OS_VA1: 20/
OD_VA2: 20/
OS_VA3: 20/
OU_VA: 20/
OS_VA1: 20/

## 2019-10-30 ASSESSMENT — VISUAL ACUITY
OD_BCVA: 20/20
OS_BCVA: 20/20

## 2019-10-30 ASSESSMENT — CONFRONTATIONAL VISUAL FIELD TEST (CVF)
OS_FINDINGS: FULL
OD_FINDINGS: FULL

## 2019-10-30 ASSESSMENT — SPHEQUIV_DERIVED
OS_SPHEQUIV: -2.875
OD_SPHEQUIV: -2.75

## 2019-11-08 ENCOUNTER — DOCTOR'S OFFICE (OUTPATIENT)
Dept: URBAN - METROPOLITAN AREA CLINIC 136 | Facility: CLINIC | Age: 29
Setting detail: OPHTHALMOLOGY
End: 2019-11-08
Payer: COMMERCIAL

## 2019-11-08 DIAGNOSIS — H50.112: ICD-10-CM

## 2019-11-08 DIAGNOSIS — H05.10: ICD-10-CM

## 2019-11-08 PROCEDURE — 92014 COMPRE OPH EXAM EST PT 1/>: CPT | Performed by: OPHTHALMOLOGY

## 2019-11-08 ASSESSMENT — VISUAL ACUITY
OS_BCVA: 20/25-1
OD_BCVA: 20/20

## 2019-11-08 ASSESSMENT — CONFRONTATIONAL VISUAL FIELD TEST (CVF)
OD_FINDINGS: FULL
OS_FINDINGS: FULL

## 2019-11-08 ASSESSMENT — REFRACTION_MANIFEST
OD_VA2: 20/
OS_VA2: 20/
OS_VA1: 20/
OS_VA3: 20/
OD_VA2: 20/
OS_VA3: 20/
OD_VA3: 20/
OD_VA3: 20/
OS_VA2: 20/
OU_VA: 20/
OS_VA1: 20/
OD_VA1: 20/
OD_VA1: 20/
OU_VA: 20/

## 2019-11-08 ASSESSMENT — REFRACTION_AUTOREFRACTION
OD_AXIS: 004
OS_AXIS: 125
OD_CYLINDER: +0.50
OD_SPHERE: -3.00
OS_SPHERE: -3.00
OS_CYLINDER: +0.25

## 2019-11-08 ASSESSMENT — REFRACTION_CURRENTRX
OD_OVR_VA: 20/
OS_CYLINDER: +0.50
OD_VPRISM_DIRECTION: SV
OD_OVR_VA: 20/
OS_OVR_VA: 20/
OD_AXIS: 085
OS_VPRISM_DIRECTION: SV
OS_SPHERE: --3.00
OD_OVR_VA: 20/
OD_SPHERE: -3.00
OS_OVR_VA: 20/
OS_OVR_VA: 20/
OD_CYLINDER: +0.25
OS_AXIS: 115

## 2019-11-08 ASSESSMENT — SPHEQUIV_DERIVED
OS_SPHEQUIV: -2.875
OD_SPHEQUIV: -2.75

## 2019-11-25 ENCOUNTER — RX ONLY (RX ONLY)
Age: 29
End: 2019-11-25

## 2019-11-25 ENCOUNTER — DOCTOR'S OFFICE (OUTPATIENT)
Dept: URBAN - METROPOLITAN AREA CLINIC 136 | Facility: CLINIC | Age: 29
Setting detail: OPHTHALMOLOGY
End: 2019-11-25
Payer: COMMERCIAL

## 2019-11-25 DIAGNOSIS — H05.10: ICD-10-CM

## 2019-11-25 DIAGNOSIS — G43.009: ICD-10-CM

## 2019-11-25 DIAGNOSIS — H50.112: ICD-10-CM

## 2019-11-25 PROCEDURE — 92012 INTRM OPH EXAM EST PATIENT: CPT | Performed by: OPHTHALMOLOGY

## 2019-11-25 ASSESSMENT — REFRACTION_MANIFEST
OU_VA: 20/
OD_VA3: 20/
OD_VA2: 20/
OS_VA2: 20/
OD_VA3: 20/
OS_VA3: 20/
OU_VA: 20/
OS_VA3: 20/
OD_VA1: 20/
OS_VA2: 20/
OS_VA1: 20/
OS_VA1: 20/
OD_VA2: 20/
OD_VA1: 20/

## 2019-11-25 ASSESSMENT — REFRACTION_CURRENTRX
OD_OVR_VA: 20/
OS_VPRISM_DIRECTION: SV
OS_OVR_VA: 20/
OD_OVR_VA: 20/
OS_SPHERE: --3.00
OS_AXIS: 115
OD_AXIS: 085
OD_CYLINDER: +0.25
OS_OVR_VA: 20/
OS_CYLINDER: +0.50
OD_VPRISM_DIRECTION: SV
OS_OVR_VA: 20/
OD_OVR_VA: 20/
OD_SPHERE: -3.00

## 2019-11-25 ASSESSMENT — CONFRONTATIONAL VISUAL FIELD TEST (CVF)
OD_FINDINGS: FULL
OS_FINDINGS: FULL

## 2019-11-25 ASSESSMENT — REFRACTION_AUTOREFRACTION
OS_AXIS: 125
OD_AXIS: 004
OD_SPHERE: -3.00
OS_SPHERE: -3.00
OD_CYLINDER: +0.50
OS_CYLINDER: +0.25

## 2019-11-25 ASSESSMENT — SPHEQUIV_DERIVED
OD_SPHEQUIV: -2.75
OS_SPHEQUIV: -2.875

## 2019-11-25 ASSESSMENT — VISUAL ACUITY
OS_BCVA: 20/20
OD_BCVA: 20/20

## 2019-12-31 ENCOUNTER — DOCTOR'S OFFICE (OUTPATIENT)
Dept: URBAN - METROPOLITAN AREA CLINIC 136 | Facility: CLINIC | Age: 29
Setting detail: OPHTHALMOLOGY
End: 2019-12-31
Payer: COMMERCIAL

## 2019-12-31 DIAGNOSIS — H05.10: ICD-10-CM

## 2019-12-31 DIAGNOSIS — G43.009: ICD-10-CM

## 2019-12-31 DIAGNOSIS — H50.112: ICD-10-CM

## 2019-12-31 PROCEDURE — 92014 COMPRE OPH EXAM EST PT 1/>: CPT | Performed by: OPHTHALMOLOGY

## 2019-12-31 ASSESSMENT — VISUAL ACUITY
OD_BCVA: 20/25-1
OS_BCVA: 20/25-1

## 2019-12-31 ASSESSMENT — REFRACTION_AUTOREFRACTION
OS_AXIS: 125
OD_AXIS: 004
OS_CYLINDER: +0.25
OS_SPHERE: -3.00
OD_CYLINDER: +0.50
OD_SPHERE: -3.00

## 2019-12-31 ASSESSMENT — REFRACTION_CURRENTRX
OS_SPHERE: --3.00
OS_AXIS: 115
OS_OVR_VA: 20/
OD_AXIS: 085
OS_CYLINDER: +0.50
OD_VPRISM_DIRECTION: SV
OD_CYLINDER: +0.25
OD_SPHERE: -3.00
OD_OVR_VA: 20/
OS_VPRISM_DIRECTION: SV

## 2019-12-31 ASSESSMENT — CONFRONTATIONAL VISUAL FIELD TEST (CVF)
OD_FINDINGS: FULL
OS_FINDINGS: FULL

## 2019-12-31 ASSESSMENT — SPHEQUIV_DERIVED
OS_SPHEQUIV: -2.875
OD_SPHEQUIV: -2.75

## 2020-02-06 ENCOUNTER — DOCTOR'S OFFICE (OUTPATIENT)
Dept: URBAN - METROPOLITAN AREA CLINIC 136 | Facility: CLINIC | Age: 30
Setting detail: OPHTHALMOLOGY
End: 2020-02-06
Payer: COMMERCIAL

## 2020-02-06 DIAGNOSIS — H50.112: ICD-10-CM

## 2020-02-06 DIAGNOSIS — H05.10: ICD-10-CM

## 2020-02-06 PROCEDURE — 92012 INTRM OPH EXAM EST PATIENT: CPT | Performed by: OPHTHALMOLOGY

## 2020-02-06 ASSESSMENT — REFRACTION_CURRENTRX
OD_SPHERE: -3.00
OS_VPRISM_DIRECTION: SV
OD_CYLINDER: +0.25
OS_OVR_VA: 20/
OS_CYLINDER: +0.50
OD_OVR_VA: 20/
OD_VPRISM_DIRECTION: SV
OS_AXIS: 115
OS_SPHERE: --3.00
OD_AXIS: 085

## 2020-02-06 ASSESSMENT — CONFRONTATIONAL VISUAL FIELD TEST (CVF)
OS_FINDINGS: FULL
OD_FINDINGS: FULL

## 2020-02-06 ASSESSMENT — VISUAL ACUITY
OD_BCVA: 20/20
OS_BCVA: 20/20-1

## 2020-02-06 ASSESSMENT — SPHEQUIV_DERIVED
OS_SPHEQUIV: -2.875
OD_SPHEQUIV: -2.75

## 2020-02-06 ASSESSMENT — REFRACTION_AUTOREFRACTION
OD_SPHERE: -3.00
OS_SPHERE: -3.00
OD_AXIS: 004
OS_CYLINDER: +0.25
OD_CYLINDER: +0.50
OS_AXIS: 125

## 2020-02-24 ENCOUNTER — RX ONLY (RX ONLY)
Age: 30
End: 2020-02-24

## 2020-02-24 ENCOUNTER — DOCTOR'S OFFICE (OUTPATIENT)
Dept: URBAN - METROPOLITAN AREA CLINIC 136 | Facility: CLINIC | Age: 30
Setting detail: OPHTHALMOLOGY
End: 2020-02-24
Payer: COMMERCIAL

## 2020-02-24 DIAGNOSIS — H50.112: ICD-10-CM

## 2020-02-24 DIAGNOSIS — H05.10: ICD-10-CM

## 2020-02-24 PROCEDURE — 92012 INTRM OPH EXAM EST PATIENT: CPT | Performed by: OPHTHALMOLOGY

## 2020-02-24 ASSESSMENT — SPHEQUIV_DERIVED
OS_SPHEQUIV: -2.875
OD_SPHEQUIV: -2.75

## 2020-02-24 ASSESSMENT — REFRACTION_CURRENTRX
OD_CYLINDER: +0.25
OD_VPRISM_DIRECTION: SV
OD_OVR_VA: 20/
OS_OVR_VA: 20/
OS_CYLINDER: +0.50
OS_SPHERE: --3.00
OS_VPRISM_DIRECTION: SV
OD_SPHERE: -3.00
OS_AXIS: 115
OD_AXIS: 085

## 2020-02-24 ASSESSMENT — REFRACTION_AUTOREFRACTION
OS_CYLINDER: +0.25
OS_SPHERE: -3.00
OD_SPHERE: -3.00
OD_AXIS: 004
OD_CYLINDER: +0.50
OS_AXIS: 125

## 2020-02-24 ASSESSMENT — CONFRONTATIONAL VISUAL FIELD TEST (CVF)
OS_FINDINGS: FULL
OD_FINDINGS: FULL

## 2020-02-24 ASSESSMENT — LID EXAM ASSESSMENTS
OD_COMMENTS: 2+ CHEMOSIS
OD_EDEMA: 2+

## 2020-02-24 ASSESSMENT — VISUAL ACUITY
OD_BCVA: 20/30+2
OS_BCVA: 20/40+1

## 2020-02-27 ENCOUNTER — DOCTOR'S OFFICE (OUTPATIENT)
Dept: URBAN - METROPOLITAN AREA CLINIC 136 | Facility: CLINIC | Age: 30
Setting detail: OPHTHALMOLOGY
End: 2020-02-27
Payer: COMMERCIAL

## 2020-02-27 ENCOUNTER — RX ONLY (RX ONLY)
Age: 30
End: 2020-02-27

## 2020-02-27 DIAGNOSIS — H16.251: ICD-10-CM

## 2020-02-27 DIAGNOSIS — H05.10: ICD-10-CM

## 2020-02-27 DIAGNOSIS — H50.112: ICD-10-CM

## 2020-02-27 PROCEDURE — 92012 INTRM OPH EXAM EST PATIENT: CPT | Performed by: OPHTHALMOLOGY

## 2020-02-27 ASSESSMENT — REFRACTION_CURRENTRX
OS_OVR_VA: 20/
OS_AXIS: 115
OD_VPRISM_DIRECTION: SV
OD_CYLINDER: +0.25
OS_SPHERE: --3.00
OD_OVR_VA: 20/
OS_CYLINDER: +0.50
OD_SPHERE: -3.00
OD_AXIS: 085
OS_VPRISM_DIRECTION: SV

## 2020-02-27 ASSESSMENT — VISUAL ACUITY
OS_BCVA: 20/20
OD_BCVA: 20/20

## 2020-02-27 ASSESSMENT — SPHEQUIV_DERIVED
OD_SPHEQUIV: -2.75
OS_SPHEQUIV: -2.875

## 2020-02-27 ASSESSMENT — REFRACTION_AUTOREFRACTION
OS_CYLINDER: +0.25
OS_SPHERE: -3.00
OD_AXIS: 004
OD_SPHERE: -3.00
OS_AXIS: 125
OD_CYLINDER: +0.50

## 2020-02-27 ASSESSMENT — CONFRONTATIONAL VISUAL FIELD TEST (CVF)
OD_FINDINGS: FULL
OS_FINDINGS: FULL

## 2020-02-27 ASSESSMENT — LID EXAM ASSESSMENTS: OD_EDEMA: 2+

## 2020-03-13 ENCOUNTER — DOCTOR'S OFFICE (OUTPATIENT)
Dept: URBAN - METROPOLITAN AREA CLINIC 136 | Facility: CLINIC | Age: 30
Setting detail: OPHTHALMOLOGY
End: 2020-03-13
Payer: COMMERCIAL

## 2020-03-13 DIAGNOSIS — H16.251: ICD-10-CM

## 2020-03-13 DIAGNOSIS — H50.112: ICD-10-CM

## 2020-03-13 DIAGNOSIS — H05.10: ICD-10-CM

## 2020-03-13 PROCEDURE — 92012 INTRM OPH EXAM EST PATIENT: CPT | Performed by: OPHTHALMOLOGY

## 2020-03-13 ASSESSMENT — REFRACTION_CURRENTRX
OS_CYLINDER: +0.50
OS_SPHERE: --3.00
OS_OVR_VA: 20/
OD_AXIS: 085
OD_CYLINDER: +0.25
OS_AXIS: 115
OD_SPHERE: -3.00
OD_OVR_VA: 20/
OS_VPRISM_DIRECTION: SV
OD_VPRISM_DIRECTION: SV

## 2020-03-13 ASSESSMENT — CONFRONTATIONAL VISUAL FIELD TEST (CVF)
OS_FINDINGS: FULL
OD_FINDINGS: FULL

## 2020-03-13 ASSESSMENT — VISUAL ACUITY
OS_BCVA: 20/20
OD_BCVA: 20/20

## 2020-03-13 ASSESSMENT — REFRACTION_AUTOREFRACTION
OS_CYLINDER: +0.25
OD_SPHERE: -3.00
OD_AXIS: 004
OS_AXIS: 125
OD_CYLINDER: +0.50
OS_SPHERE: -3.00

## 2020-03-13 ASSESSMENT — SPHEQUIV_DERIVED
OS_SPHEQUIV: -2.875
OD_SPHEQUIV: -2.75

## 2020-03-13 ASSESSMENT — LID EXAM ASSESSMENTS: OD_EDEMA: RLL RUL 1+

## 2020-04-09 ENCOUNTER — DOCTOR'S OFFICE (OUTPATIENT)
Dept: URBAN - METROPOLITAN AREA CLINIC 136 | Facility: CLINIC | Age: 30
Setting detail: OPHTHALMOLOGY
End: 2020-04-09
Payer: COMMERCIAL

## 2020-04-09 DIAGNOSIS — H50.112: ICD-10-CM

## 2020-04-09 DIAGNOSIS — H16.251: ICD-10-CM

## 2020-04-09 DIAGNOSIS — H05.10: ICD-10-CM

## 2020-04-09 PROCEDURE — 92014 COMPRE OPH EXAM EST PT 1/>: CPT | Performed by: OPHTHALMOLOGY

## 2020-04-09 ASSESSMENT — VISUAL ACUITY
OS_BCVA: 20/20
OD_BCVA: 20/20

## 2020-04-09 ASSESSMENT — REFRACTION_CURRENTRX
OD_AXIS: 085
OD_SPHERE: -3.00
OS_AXIS: 115
OS_OVR_VA: 20/
OD_OVR_VA: 20/
OD_VPRISM_DIRECTION: SV
OS_SPHERE: --3.00
OD_CYLINDER: +0.25
OS_CYLINDER: +0.50
OS_VPRISM_DIRECTION: SV

## 2020-04-09 ASSESSMENT — CONFRONTATIONAL VISUAL FIELD TEST (CVF)
OS_FINDINGS: FULL
OD_FINDINGS: FULL

## 2020-04-09 ASSESSMENT — LID EXAM ASSESSMENTS: OD_EDEMA: RLL RUL 1+

## 2020-04-09 ASSESSMENT — SPHEQUIV_DERIVED
OS_SPHEQUIV: -2.875
OD_SPHEQUIV: -2.75

## 2020-04-09 ASSESSMENT — REFRACTION_AUTOREFRACTION
OD_AXIS: 004
OD_SPHERE: -3.00
OS_AXIS: 125
OD_CYLINDER: +0.50
OS_SPHERE: -3.00
OS_CYLINDER: +0.25

## 2020-05-12 ENCOUNTER — DOCTOR'S OFFICE (OUTPATIENT)
Dept: URBAN - METROPOLITAN AREA CLINIC 136 | Facility: CLINIC | Age: 30
Setting detail: OPHTHALMOLOGY
End: 2020-05-12
Payer: COMMERCIAL

## 2020-05-12 DIAGNOSIS — H05.10: ICD-10-CM

## 2020-05-12 DIAGNOSIS — H16.251: ICD-10-CM

## 2020-05-12 DIAGNOSIS — H50.112: ICD-10-CM

## 2020-05-12 PROCEDURE — 92014 COMPRE OPH EXAM EST PT 1/>: CPT | Performed by: OPHTHALMOLOGY

## 2020-05-12 ASSESSMENT — CONFRONTATIONAL VISUAL FIELD TEST (CVF)
OS_FINDINGS: FULL
OD_FINDINGS: FULL

## 2020-05-12 ASSESSMENT — LID EXAM ASSESSMENTS: OD_EDEMA: RLL RUL 1+

## 2020-05-13 ASSESSMENT — REFRACTION_CURRENTRX
OD_CYLINDER: +0.25
OS_SPHERE: --3.00
OS_AXIS: 115
OD_VPRISM_DIRECTION: SV
OD_AXIS: 085
OS_VPRISM_DIRECTION: SV
OS_CYLINDER: +0.50
OS_OVR_VA: 20/
OD_OVR_VA: 20/
OD_SPHERE: -3.00

## 2020-05-13 ASSESSMENT — REFRACTION_AUTOREFRACTION
OD_SPHERE: -3.00
OS_AXIS: 125
OS_SPHERE: -3.00
OD_CYLINDER: +0.50
OD_AXIS: 004
OS_CYLINDER: +0.25

## 2020-05-13 ASSESSMENT — VISUAL ACUITY
OD_BCVA: 20/20-1
OS_BCVA: 20/20

## 2020-05-13 ASSESSMENT — SPHEQUIV_DERIVED
OD_SPHEQUIV: -2.75
OS_SPHEQUIV: -2.875

## 2020-05-22 ENCOUNTER — RX ONLY (RX ONLY)
Age: 30
End: 2020-05-22

## 2020-05-22 ENCOUNTER — DOCTOR'S OFFICE (OUTPATIENT)
Dept: URBAN - NONMETROPOLITAN AREA CLINIC 1 | Facility: CLINIC | Age: 30
Setting detail: OPHTHALMOLOGY
End: 2020-05-22
Payer: COMMERCIAL

## 2020-05-22 DIAGNOSIS — H16.251: ICD-10-CM

## 2020-05-22 DIAGNOSIS — H05.10: ICD-10-CM

## 2020-05-22 DIAGNOSIS — H50.112: ICD-10-CM

## 2020-05-22 PROCEDURE — 92083 EXTENDED VISUAL FIELD XM: CPT | Performed by: OPHTHALMOLOGY

## 2020-05-22 PROCEDURE — 99214 OFFICE O/P EST MOD 30 MIN: CPT | Performed by: OPHTHALMOLOGY

## 2020-05-22 ASSESSMENT — CONFRONTATIONAL VISUAL FIELD TEST (CVF)
OD_FINDINGS: FULL
OS_FINDINGS: FULL

## 2020-05-22 ASSESSMENT — LID EXAM ASSESSMENTS: OD_EDEMA: RLL RUL 2+

## 2020-05-29 ASSESSMENT — REFRACTION_AUTOREFRACTION
OS_SPHERE: -3.00
OD_AXIS: 004
OD_CYLINDER: +0.50
OS_AXIS: 125
OD_SPHERE: -3.00
OS_CYLINDER: +0.25

## 2020-05-29 ASSESSMENT — REFRACTION_CURRENTRX
OS_OVR_VA: 20/
OD_VPRISM_DIRECTION: SV
OD_SPHERE: -3.00
OD_AXIS: 085
OD_OVR_VA: 20/
OD_CYLINDER: +0.25
OS_VPRISM_DIRECTION: SV
OS_AXIS: 115
OS_SPHERE: --3.00
OS_CYLINDER: +0.50

## 2020-05-29 ASSESSMENT — SPHEQUIV_DERIVED
OD_SPHEQUIV: -2.75
OS_SPHEQUIV: -2.875

## 2020-05-29 ASSESSMENT — VISUAL ACUITY
OD_BCVA: 20/20
OS_BCVA: 20/40

## 2020-06-02 ENCOUNTER — AMBUL SURGICAL CARE (OUTPATIENT)
Dept: URBAN - METROPOLITAN AREA SURGERY 32 | Facility: SURGERY | Age: 30
Setting detail: OPHTHALMOLOGY
End: 2020-06-02
Payer: COMMERCIAL

## 2020-06-02 ENCOUNTER — LAB REQUISITION (OUTPATIENT)
Dept: LAB | Facility: HOSPITAL | Age: 30
End: 2020-06-02
Payer: COMMERCIAL

## 2020-06-02 DIAGNOSIS — H05.10 UNSPECIFIED CHRONIC INFLAMMATORY DISORDERS OF ORBIT: ICD-10-CM

## 2020-06-02 DIAGNOSIS — H05.10: ICD-10-CM

## 2020-06-02 PROCEDURE — G8918 PT W/O PREOP ORDER IV AB PRO: HCPCS | Performed by: OPHTHALMOLOGY

## 2020-06-02 PROCEDURE — G8907 PT DOC NO EVENTS ON DISCHARG: HCPCS | Performed by: OPHTHALMOLOGY

## 2020-06-02 PROCEDURE — 88304 TISSUE EXAM BY PATHOLOGIST: CPT | Performed by: PATHOLOGY

## 2020-06-02 PROCEDURE — 67346 BIOPSY EYE MUSCLE: CPT | Performed by: OPHTHALMOLOGY

## 2020-06-03 ENCOUNTER — RX ONLY (RX ONLY)
Age: 30
End: 2020-06-03

## 2020-06-03 ENCOUNTER — DOCTOR'S OFFICE (OUTPATIENT)
Dept: URBAN - METROPOLITAN AREA CLINIC 136 | Facility: CLINIC | Age: 30
Setting detail: OPHTHALMOLOGY
End: 2020-06-03
Payer: COMMERCIAL

## 2020-06-03 DIAGNOSIS — H16.251: ICD-10-CM

## 2020-06-03 DIAGNOSIS — H05.10: ICD-10-CM

## 2020-06-03 DIAGNOSIS — H50.112: ICD-10-CM

## 2020-06-03 PROCEDURE — 99024 POSTOP FOLLOW-UP VISIT: CPT | Performed by: OPHTHALMOLOGY

## 2020-06-03 ASSESSMENT — REFRACTION_AUTOREFRACTION
OD_CYLINDER: +0.50
OS_CYLINDER: +0.25
OS_AXIS: 125
OD_SPHERE: -3.00
OD_AXIS: 004
OS_SPHERE: -3.00

## 2020-06-03 ASSESSMENT — REFRACTION_CURRENTRX
OS_VPRISM_DIRECTION: SV
OD_VPRISM_DIRECTION: SV
OD_AXIS: 085
OS_OVR_VA: 20/
OD_CYLINDER: +0.25
OD_OVR_VA: 20/
OS_SPHERE: --3.00
OS_AXIS: 115
OD_SPHERE: -3.00
OS_CYLINDER: +0.50

## 2020-06-03 ASSESSMENT — VISUAL ACUITY
OD_BCVA: 20/25
OS_BCVA: 20/400

## 2020-06-03 ASSESSMENT — SPHEQUIV_DERIVED
OS_SPHEQUIV: -2.875
OD_SPHEQUIV: -2.75

## 2020-06-03 ASSESSMENT — LID EXAM ASSESSMENTS: OD_EDEMA: RUL 2+

## 2020-06-04 ENCOUNTER — RX ONLY (RX ONLY)
Age: 30
End: 2020-06-04

## 2020-06-04 ENCOUNTER — DOCTOR'S OFFICE (OUTPATIENT)
Dept: URBAN - METROPOLITAN AREA CLINIC 136 | Facility: CLINIC | Age: 30
Setting detail: OPHTHALMOLOGY
End: 2020-06-04
Payer: COMMERCIAL

## 2020-06-04 DIAGNOSIS — H16.251: ICD-10-CM

## 2020-06-04 DIAGNOSIS — H50.112: ICD-10-CM

## 2020-06-04 DIAGNOSIS — H05.10: ICD-10-CM

## 2020-06-04 PROCEDURE — 99024 POSTOP FOLLOW-UP VISIT: CPT | Performed by: OPHTHALMOLOGY

## 2020-06-04 ASSESSMENT — REFRACTION_CURRENTRX
OS_CYLINDER: +0.50
OD_VPRISM_DIRECTION: SV
OD_CYLINDER: +0.25
OD_AXIS: 085
OD_SPHERE: -3.00
OS_SPHERE: --3.00
OS_VPRISM_DIRECTION: SV
OS_OVR_VA: 20/
OD_OVR_VA: 20/
OS_AXIS: 115

## 2020-06-04 ASSESSMENT — REFRACTION_AUTOREFRACTION
OS_SPHERE: -3.00
OD_CYLINDER: +0.50
OS_CYLINDER: +0.25
OS_AXIS: 125
OD_AXIS: 004
OD_SPHERE: -3.00

## 2020-06-04 ASSESSMENT — SPHEQUIV_DERIVED
OS_SPHEQUIV: -2.875
OD_SPHEQUIV: -2.75

## 2020-06-04 ASSESSMENT — VISUAL ACUITY
OD_BCVA: 20/25
OS_BCVA: 20/125

## 2020-06-04 ASSESSMENT — LID EXAM ASSESSMENTS: OD_EDEMA: RUL 2+

## 2020-06-09 ENCOUNTER — RX ONLY (RX ONLY)
Age: 30
End: 2020-06-09

## 2020-06-09 ENCOUNTER — DOCTOR'S OFFICE (OUTPATIENT)
Dept: URBAN - METROPOLITAN AREA CLINIC 136 | Facility: CLINIC | Age: 30
Setting detail: OPHTHALMOLOGY
End: 2020-06-09
Payer: COMMERCIAL

## 2020-06-09 DIAGNOSIS — H05.10: ICD-10-CM

## 2020-06-09 DIAGNOSIS — H16.251: ICD-10-CM

## 2020-06-09 DIAGNOSIS — H50.112: ICD-10-CM

## 2020-06-09 PROCEDURE — 92014 COMPRE OPH EXAM EST PT 1/>: CPT | Performed by: OPHTHALMOLOGY

## 2020-06-09 ASSESSMENT — REFRACTION_AUTOREFRACTION
OS_AXIS: 125
OD_AXIS: 004
OD_SPHERE: -3.00
OS_CYLINDER: +0.25
OS_SPHERE: -3.00
OD_CYLINDER: +0.50

## 2020-06-09 ASSESSMENT — SPHEQUIV_DERIVED
OD_SPHEQUIV: -2.75
OS_SPHEQUIV: -2.875

## 2020-06-09 ASSESSMENT — REFRACTION_CURRENTRX
OS_VPRISM_DIRECTION: SV
OD_OVR_VA: 20/
OS_CYLINDER: +0.50
OD_CYLINDER: +0.25
OS_OVR_VA: 20/
OD_SPHERE: -3.00
OS_AXIS: 115
OD_VPRISM_DIRECTION: SV
OS_SPHERE: --3.00
OD_AXIS: 085

## 2020-06-09 ASSESSMENT — VISUAL ACUITY
OD_BCVA: 20/20
OS_BCVA: 20/20

## 2020-06-09 ASSESSMENT — SUPERFICIAL PUNCTATE KERATITIS (SPK): OD_SPK: 1+

## 2020-06-09 ASSESSMENT — CONFRONTATIONAL VISUAL FIELD TEST (CVF)
OD_FINDINGS: FULL
OS_FINDINGS: FULL

## 2020-06-09 ASSESSMENT — LID EXAM ASSESSMENTS: OD_EDEMA: RUL 2+

## 2020-06-18 ENCOUNTER — DOCTOR'S OFFICE (OUTPATIENT)
Dept: URBAN - METROPOLITAN AREA CLINIC 136 | Facility: CLINIC | Age: 30
Setting detail: OPHTHALMOLOGY
End: 2020-06-18
Payer: COMMERCIAL

## 2020-06-18 DIAGNOSIS — H50.112: ICD-10-CM

## 2020-06-18 DIAGNOSIS — H05.10: ICD-10-CM

## 2020-06-18 DIAGNOSIS — H16.251: ICD-10-CM

## 2020-06-18 PROCEDURE — 92012 INTRM OPH EXAM EST PATIENT: CPT | Performed by: OPHTHALMOLOGY

## 2020-06-18 ASSESSMENT — SPHEQUIV_DERIVED
OS_SPHEQUIV: -2.875
OD_SPHEQUIV: -2.75

## 2020-06-18 ASSESSMENT — REFRACTION_AUTOREFRACTION
OD_CYLINDER: +0.50
OS_AXIS: 125
OD_SPHERE: -3.00
OS_SPHERE: -3.00
OD_AXIS: 004
OS_CYLINDER: +0.25

## 2020-06-18 ASSESSMENT — SUPERFICIAL PUNCTATE KERATITIS (SPK): OD_SPK: 1+

## 2020-06-18 ASSESSMENT — REFRACTION_CURRENTRX
OD_VPRISM_DIRECTION: SV
OS_VPRISM_DIRECTION: SV
OD_AXIS: 085
OS_AXIS: 115
OD_SPHERE: -3.00
OS_CYLINDER: +0.50
OS_OVR_VA: 20/
OD_CYLINDER: +0.25
OS_SPHERE: --3.00
OD_OVR_VA: 20/

## 2020-06-18 ASSESSMENT — VISUAL ACUITY
OS_BCVA: 20/20
OD_BCVA: 20/25

## 2020-06-30 ENCOUNTER — DOCTOR'S OFFICE (OUTPATIENT)
Dept: URBAN - METROPOLITAN AREA CLINIC 136 | Facility: CLINIC | Age: 30
Setting detail: OPHTHALMOLOGY
End: 2020-06-30
Payer: COMMERCIAL

## 2020-06-30 DIAGNOSIS — L98.0: ICD-10-CM

## 2020-06-30 DIAGNOSIS — H05.10: ICD-10-CM

## 2020-06-30 PROBLEM — H16.251 PHLYCTENULAR KERATOCONJUNCTIVITS; RIGHT EYE: Status: RESOLVED | Noted: 2020-02-24 | Resolved: 2020-06-30

## 2020-06-30 PROBLEM — H50.112: Status: RESOLVED | Noted: 2019-10-25 | Resolved: 2020-06-30

## 2020-06-30 PROCEDURE — 92014 COMPRE OPH EXAM EST PT 1/>: CPT | Performed by: OPHTHALMOLOGY

## 2020-06-30 ASSESSMENT — REFRACTION_CURRENTRX
OD_CYLINDER: +0.25
OS_SPHERE: --3.00
OS_CYLINDER: +0.50
OD_OVR_VA: 20/
OD_AXIS: 085
OS_OVR_VA: 20/
OD_VPRISM_DIRECTION: SV
OS_AXIS: 115
OS_VPRISM_DIRECTION: SV
OD_SPHERE: -3.00

## 2020-06-30 ASSESSMENT — REFRACTION_AUTOREFRACTION
OS_SPHERE: -3.00
OS_CYLINDER: +0.25
OD_CYLINDER: +0.50
OD_SPHERE: -3.00
OD_AXIS: 004
OS_AXIS: 125

## 2020-06-30 ASSESSMENT — SUPERFICIAL PUNCTATE KERATITIS (SPK): OD_SPK: 1+

## 2020-06-30 ASSESSMENT — CONFRONTATIONAL VISUAL FIELD TEST (CVF)
OS_FINDINGS: FULL
OD_FINDINGS: FULL

## 2020-06-30 ASSESSMENT — SPHEQUIV_DERIVED
OS_SPHEQUIV: -2.875
OD_SPHEQUIV: -2.75

## 2020-06-30 ASSESSMENT — VISUAL ACUITY
OD_BCVA: 20/30-1
OS_BCVA: 20/20

## 2020-06-30 ASSESSMENT — LID EXAM ASSESSMENTS: OS_EDEMA: T

## 2020-07-09 ENCOUNTER — DOCTOR'S OFFICE (OUTPATIENT)
Dept: URBAN - METROPOLITAN AREA CLINIC 136 | Facility: CLINIC | Age: 30
Setting detail: OPHTHALMOLOGY
End: 2020-07-09
Payer: COMMERCIAL

## 2020-07-09 ENCOUNTER — RX ONLY (RX ONLY)
Age: 30
End: 2020-07-09

## 2020-07-09 DIAGNOSIS — H05.10: ICD-10-CM

## 2020-07-09 DIAGNOSIS — L98.0: ICD-10-CM

## 2020-07-09 PROCEDURE — 92014 COMPRE OPH EXAM EST PT 1/>: CPT | Performed by: OPHTHALMOLOGY

## 2020-07-09 ASSESSMENT — VISUAL ACUITY
OD_BCVA: 20/30+2
OS_BCVA: 20/20

## 2020-07-09 ASSESSMENT — LID EXAM ASSESSMENTS: OS_EDEMA: T

## 2020-07-09 ASSESSMENT — REFRACTION_CURRENTRX
OS_VPRISM_DIRECTION: SV
OD_VPRISM_DIRECTION: SV
OD_CYLINDER: +0.25
OD_AXIS: 085
OS_SPHERE: --3.00
OS_AXIS: 115
OD_OVR_VA: 20/
OS_CYLINDER: +0.50
OD_SPHERE: -3.00
OS_OVR_VA: 20/

## 2020-07-09 ASSESSMENT — REFRACTION_AUTOREFRACTION
OD_SPHERE: -3.00
OS_SPHERE: -3.00
OS_AXIS: 125
OS_CYLINDER: +0.25
OD_CYLINDER: +0.50
OD_AXIS: 004

## 2020-07-09 ASSESSMENT — CONFRONTATIONAL VISUAL FIELD TEST (CVF)
OS_FINDINGS: FULL
OD_FINDINGS: FULL

## 2020-07-09 ASSESSMENT — SPHEQUIV_DERIVED
OD_SPHEQUIV: -2.75
OS_SPHEQUIV: -2.875

## 2020-07-09 ASSESSMENT — SUPERFICIAL PUNCTATE KERATITIS (SPK): OD_SPK: 1+

## 2020-07-23 ENCOUNTER — DOCTOR'S OFFICE (OUTPATIENT)
Dept: URBAN - METROPOLITAN AREA CLINIC 136 | Facility: CLINIC | Age: 30
Setting detail: OPHTHALMOLOGY
End: 2020-07-23
Payer: COMMERCIAL

## 2020-07-23 ENCOUNTER — RX ONLY (RX ONLY)
Age: 30
End: 2020-07-23

## 2020-07-23 DIAGNOSIS — H05.10: ICD-10-CM

## 2020-07-23 DIAGNOSIS — L98.0: ICD-10-CM

## 2020-07-23 PROCEDURE — 92014 COMPRE OPH EXAM EST PT 1/>: CPT | Performed by: OPHTHALMOLOGY

## 2020-07-23 ASSESSMENT — REFRACTION_CURRENTRX
OD_VPRISM_DIRECTION: SV
OS_VPRISM_DIRECTION: SV
OS_SPHERE: --3.00
OD_CYLINDER: +0.25
OS_CYLINDER: +0.50
OS_OVR_VA: 20/
OD_SPHERE: -3.00
OD_AXIS: 085
OS_AXIS: 115
OD_OVR_VA: 20/

## 2020-07-23 ASSESSMENT — LID EXAM ASSESSMENTS: OS_EDEMA: T

## 2020-07-23 ASSESSMENT — REFRACTION_AUTOREFRACTION
OD_CYLINDER: +0.50
OD_SPHERE: -3.00
OS_CYLINDER: +0.25
OS_AXIS: 125
OD_AXIS: 004
OS_SPHERE: -3.00

## 2020-07-23 ASSESSMENT — CONFRONTATIONAL VISUAL FIELD TEST (CVF)
OD_FINDINGS: FULL
OS_FINDINGS: FULL

## 2020-07-23 ASSESSMENT — SUPERFICIAL PUNCTATE KERATITIS (SPK): OD_SPK: 1+

## 2020-07-23 ASSESSMENT — VISUAL ACUITY
OS_BCVA: 20/20
OD_BCVA: 20/25-2

## 2020-07-23 ASSESSMENT — SPHEQUIV_DERIVED
OS_SPHEQUIV: -2.875
OD_SPHEQUIV: -2.75

## 2020-08-07 ENCOUNTER — DOCTOR'S OFFICE (OUTPATIENT)
Dept: URBAN - METROPOLITAN AREA CLINIC 136 | Facility: CLINIC | Age: 30
Setting detail: OPHTHALMOLOGY
End: 2020-08-07
Payer: COMMERCIAL

## 2020-08-07 DIAGNOSIS — H52.13: ICD-10-CM

## 2020-08-07 PROCEDURE — 92015 DETERMINE REFRACTIVE STATE: CPT | Performed by: OPTOMETRIST

## 2020-08-07 PROCEDURE — 92012 INTRM OPH EXAM EST PATIENT: CPT | Performed by: OPTOMETRIST

## 2020-08-07 PROCEDURE — 92310 CONTACT LENS FITTING OU: CPT | Performed by: OPTOMETRIST

## 2020-08-07 ASSESSMENT — REFRACTION_CURRENTRX
OS_VPRISM_DIRECTION: SV
OS_AXIS: 115
OD_AXIS: 085
OS_SPHERE: -3.00
OS_OVR_VA: 20/
OD_VPRISM_DIRECTION: SV
OD_OVR_VA: 20/
OD_SPHERE: -3.00
OD_CYLINDER: -0.25
OS_CYLINDER: -0.50

## 2020-08-07 ASSESSMENT — VISUAL ACUITY
OD_BCVA: 20/20
OS_BCVA: 20/20

## 2020-08-07 ASSESSMENT — REFRACTION_AUTOREFRACTION
OS_SPHERE: -2.50
OD_AXIS: 092
OD_CYLINDER: -0.75
OD_SPHERE: -2.25
OS_AXIS: 042
OS_CYLINDER: -0.25

## 2020-08-07 ASSESSMENT — REFRACTION_MANIFEST
OD_SPHERE: -3.00
OD_VA1: 20/20
OS_VA1: 20/20
OU_VA: 20/20
OS_CYLINDER: -0.50
OS_AXIS: 055
OD_CYLINDER: SPH
OS_SPHERE: -2.75

## 2020-08-07 ASSESSMENT — SPHEQUIV_DERIVED
OS_SPHEQUIV: -3
OS_SPHEQUIV: -2.625
OD_SPHEQUIV: -2.625

## 2020-08-18 ENCOUNTER — OPTICAL OFFICE (OUTPATIENT)
Dept: URBAN - METROPOLITAN AREA CLINIC 143 | Facility: CLINIC | Age: 30
Setting detail: OPHTHALMOLOGY
End: 2020-08-18
Payer: COMMERCIAL

## 2020-08-18 DIAGNOSIS — H52.13: ICD-10-CM

## 2020-08-18 PROCEDURE — V2020 VISION SVCS FRAMES PURCHASES: HCPCS | Performed by: OPTOMETRIST

## 2020-08-18 PROCEDURE — V2100 LENS SPHER SINGLE PLANO 4.00: HCPCS | Performed by: OPTOMETRIST

## 2020-08-18 PROCEDURE — V2750 ANTI-REFLECTIVE COATING: HCPCS | Performed by: OPTOMETRIST

## 2020-08-18 PROCEDURE — V2103 SPHEROCYLINDR 4.00D/12-2.00D: HCPCS | Performed by: OPTOMETRIST

## 2020-08-25 ENCOUNTER — DOCTOR'S OFFICE (OUTPATIENT)
Dept: URBAN - METROPOLITAN AREA CLINIC 136 | Facility: CLINIC | Age: 30
Setting detail: OPHTHALMOLOGY
End: 2020-08-25
Payer: COMMERCIAL

## 2020-08-25 DIAGNOSIS — H05.10: ICD-10-CM

## 2020-08-25 DIAGNOSIS — L98.0: ICD-10-CM

## 2020-08-25 PROCEDURE — 92012 INTRM OPH EXAM EST PATIENT: CPT | Performed by: OPHTHALMOLOGY

## 2020-08-25 ASSESSMENT — REFRACTION_CURRENTRX
OS_CYLINDER: -0.50
OD_OVR_VA: 20/
OD_CYLINDER: -0.25
OD_AXIS: 085
OD_VPRISM_DIRECTION: SV
OD_SPHERE: -3.00
OS_OVR_VA: 20/
OS_SPHERE: -3.00
OS_AXIS: 115
OS_VPRISM_DIRECTION: SV

## 2020-08-25 ASSESSMENT — REFRACTION_MANIFEST
OS_VA1: 20/20
OD_CYLINDER: SPH
OS_CYLINDER: -0.50
OU_VA: 20/20
OD_SPHERE: -3.00
OS_SPHERE: -2.75
OS_AXIS: 055
OD_VA1: 20/20

## 2020-08-25 ASSESSMENT — CONFRONTATIONAL VISUAL FIELD TEST (CVF)
OS_FINDINGS: FULL
OD_FINDINGS: FULL

## 2020-08-25 ASSESSMENT — REFRACTION_AUTOREFRACTION
OS_AXIS: 042
OD_CYLINDER: -0.75
OS_CYLINDER: -0.25
OD_AXIS: 092
OS_SPHERE: -2.50
OD_SPHERE: -2.25

## 2020-08-25 ASSESSMENT — VISUAL ACUITY
OS_BCVA: 20/20
OD_BCVA: 20/30+1

## 2020-08-25 ASSESSMENT — SPHEQUIV_DERIVED
OS_SPHEQUIV: -2.625
OD_SPHEQUIV: -2.625
OS_SPHEQUIV: -3

## 2020-08-25 ASSESSMENT — LID EXAM ASSESSMENTS: OS_EDEMA: T

## 2020-08-25 ASSESSMENT — SUPERFICIAL PUNCTATE KERATITIS (SPK): OD_SPK: ABSENT

## 2021-01-26 ENCOUNTER — DOCTOR'S OFFICE (OUTPATIENT)
Dept: URBAN - METROPOLITAN AREA CLINIC 136 | Facility: CLINIC | Age: 31
Setting detail: OPHTHALMOLOGY
End: 2021-01-26
Payer: COMMERCIAL

## 2021-01-26 DIAGNOSIS — H05.10: ICD-10-CM

## 2021-01-26 DIAGNOSIS — L98.0: ICD-10-CM

## 2021-01-26 DIAGNOSIS — G43.009: ICD-10-CM

## 2021-01-26 PROBLEM — H52.13 MYOPIA; BOTH EYES: Status: ACTIVE | Noted: 2020-08-07

## 2021-01-26 PROCEDURE — 92012 INTRM OPH EXAM EST PATIENT: CPT | Performed by: OPHTHALMOLOGY

## 2021-01-26 ASSESSMENT — TONOMETRY
OS_IOP_MMHG: 13
OD_IOP_MMHG: 12

## 2021-01-26 ASSESSMENT — REFRACTION_CURRENTRX
OS_OVR_VA: 20/
OD_VPRISM_DIRECTION: SV
OD_CYLINDER: -0.25
OD_OVR_VA: 20/
OS_VPRISM_DIRECTION: SV
OD_AXIS: 085
OS_AXIS: 115
OS_SPHERE: -3.00
OS_CYLINDER: -0.50
OD_SPHERE: -3.00

## 2021-01-26 ASSESSMENT — SPHEQUIV_DERIVED
OD_SPHEQUIV: -2.625
OS_SPHEQUIV: -2.625
OS_SPHEQUIV: -3

## 2021-01-26 ASSESSMENT — REFRACTION_AUTOREFRACTION
OD_AXIS: 092
OD_CYLINDER: -0.75
OD_SPHERE: -2.25
OS_SPHERE: -2.50
OS_AXIS: 042
OS_CYLINDER: -0.25

## 2021-01-26 ASSESSMENT — REFRACTION_MANIFEST
OD_CYLINDER: SPH
OD_VA1: 20/20
OS_CYLINDER: -0.50
OU_VA: 20/20
OD_SPHERE: -3.00
OS_AXIS: 055
OS_VA1: 20/20
OS_SPHERE: -2.75

## 2021-01-26 ASSESSMENT — CONFRONTATIONAL VISUAL FIELD TEST (CVF)
OD_FINDINGS: FULL
OS_FINDINGS: FULL

## 2021-01-26 ASSESSMENT — LID EXAM ASSESSMENTS: OS_EDEMA: T

## 2021-01-26 ASSESSMENT — VISUAL ACUITY
OS_BCVA: 20/20
OD_BCVA: 20/20-2

## 2021-01-26 ASSESSMENT — SUPERFICIAL PUNCTATE KERATITIS (SPK): OD_SPK: ABSENT

## 2021-03-03 NOTE — ED ATTENDING ATTESTATION
Have Your Skin Lesions Been Treated?: not been treated Joseline Marroquin DO, saw and evaluated the patient  I have discussed the patient with the resident/non-physician practitioner and agree with the resident's/non-physician practitioner's findings, Plan of Care, and MDM as documented in the resident's/non-physician practitioner's note, except where noted  All available labs and Radiology studies were reviewed  At this point I agree with the current assessment done in the Emergency Department  I have conducted an independent evaluation of this patient a history and physical is as follows:    23 yo female presents for evaluation of bilateral flank pain on and off throughout the day today, lasting a few seconds at a time  Radiates to suprapubic region  Also has HA that is typical of her migraine HA (L frontal)  This has improved a little since onset  Pt took ibuprofen 600mg earlier which did not improve her flank pain  Admits dysuria, hematuria  Denies other urinary sxs, vag dc or bleeding, f/c/n/v  Has hx of renal stones  Imp: bilateral flank pain, hematuria, dysuria  HA likely unrelated  Plan: UA, upreg, BMP, CT stone protocol, tx HA  Reassess        Critical Care Time  CritCare Time    Procedures Is This A New Presentation, Or A Follow-Up?: Follow Up Skin Lesions How Severe Is Your Skin Lesion?: mild Additional History: Patient stated that the planter warts on her foot is gone

## 2023-05-12 NOTE — DISCHARGE INSTRUCTIONS
